# Patient Record
Sex: MALE | Race: WHITE | NOT HISPANIC OR LATINO | URBAN - METROPOLITAN AREA
[De-identification: names, ages, dates, MRNs, and addresses within clinical notes are randomized per-mention and may not be internally consistent; named-entity substitution may affect disease eponyms.]

---

## 2023-05-05 ENCOUNTER — ON CAMPUS - OUTPATIENT (OUTPATIENT)
Dept: URBAN - METROPOLITAN AREA HOSPITAL 2 | Facility: HOSPITAL | Age: 43
End: 2023-05-05

## 2023-05-05 VITALS
DIASTOLIC BLOOD PRESSURE: 61 MMHG | SYSTOLIC BLOOD PRESSURE: 109 MMHG | HEART RATE: 78 BPM | OXYGEN SATURATION: 98 % | HEART RATE: 80 BPM | RESPIRATION RATE: 17 BRPM | SYSTOLIC BLOOD PRESSURE: 91 MMHG | DIASTOLIC BLOOD PRESSURE: 68 MMHG | SYSTOLIC BLOOD PRESSURE: 112 MMHG | RESPIRATION RATE: 14 BRPM | RESPIRATION RATE: 16 BRPM | SYSTOLIC BLOOD PRESSURE: 127 MMHG | HEIGHT: 71 IN | HEART RATE: 74 BPM | DIASTOLIC BLOOD PRESSURE: 82 MMHG | HEART RATE: 75 BPM | DIASTOLIC BLOOD PRESSURE: 71 MMHG | DIASTOLIC BLOOD PRESSURE: 66 MMHG | HEART RATE: 69 BPM | TEMPERATURE: 98.6 F | WEIGHT: 197 LBS | HEART RATE: 73 BPM | SYSTOLIC BLOOD PRESSURE: 98 MMHG | DIASTOLIC BLOOD PRESSURE: 79 MMHG | SYSTOLIC BLOOD PRESSURE: 122 MMHG | DIASTOLIC BLOOD PRESSURE: 78 MMHG | RESPIRATION RATE: 15 BRPM | SYSTOLIC BLOOD PRESSURE: 101 MMHG | HEART RATE: 91 BPM | HEART RATE: 66 BPM | DIASTOLIC BLOOD PRESSURE: 62 MMHG | OXYGEN SATURATION: 97 % | OXYGEN SATURATION: 95 % | OXYGEN SATURATION: 96 % | SYSTOLIC BLOOD PRESSURE: 100 MMHG | DIASTOLIC BLOOD PRESSURE: 56 MMHG | OXYGEN SATURATION: 99 %

## 2023-05-05 DIAGNOSIS — K64.8 OTHER HEMORRHOIDS: ICD-10-CM

## 2023-05-05 DIAGNOSIS — R19.5 OTHER FECAL ABNORMALITIES: ICD-10-CM

## 2023-05-05 PROCEDURE — 45378 DIAGNOSTIC COLONOSCOPY: CPT | Performed by: INTERNAL MEDICINE

## 2023-05-05 RX ORDER — HYDROCORTISONE CREAM 1% 10 MG/G
2 CREAM TOPICAL
Qty: 100 | Refills: 3 | Status: ACTIVE
Start: 2023-05-05

## 2024-04-25 ENCOUNTER — TRANSCRIBE ORDERS (OUTPATIENT)
Dept: ADMINISTRATIVE | Facility: HOSPITAL | Age: 44
End: 2024-04-25
Payer: COMMERCIAL

## 2024-04-25 DIAGNOSIS — E06.9 THYROIDITIS: Primary | ICD-10-CM

## 2024-09-16 ENCOUNTER — LAB (OUTPATIENT)
Dept: FAMILY MEDICINE CLINIC | Facility: CLINIC | Age: 44
End: 2024-09-16
Payer: COMMERCIAL

## 2024-09-16 ENCOUNTER — OFFICE VISIT (OUTPATIENT)
Dept: FAMILY MEDICINE CLINIC | Facility: CLINIC | Age: 44
End: 2024-09-16
Payer: COMMERCIAL

## 2024-09-16 VITALS
OXYGEN SATURATION: 98 % | SYSTOLIC BLOOD PRESSURE: 90 MMHG | DIASTOLIC BLOOD PRESSURE: 54 MMHG | HEIGHT: 70 IN | TEMPERATURE: 98.3 F | BODY MASS INDEX: 29.68 KG/M2 | HEART RATE: 65 BPM | RESPIRATION RATE: 16 BRPM | WEIGHT: 207.3 LBS

## 2024-09-16 DIAGNOSIS — Z76.89 ENCOUNTER TO ESTABLISH CARE: ICD-10-CM

## 2024-09-16 DIAGNOSIS — E04.9 ENLARGED THYROID: ICD-10-CM

## 2024-09-16 DIAGNOSIS — E03.9 ACQUIRED HYPOTHYROIDISM: Primary | ICD-10-CM

## 2024-09-16 DIAGNOSIS — R51.9 SCALP PAIN: ICD-10-CM

## 2024-09-16 DIAGNOSIS — S16.1XXA NECK STRAIN, INITIAL ENCOUNTER: ICD-10-CM

## 2024-09-16 DIAGNOSIS — E03.9 ACQUIRED HYPOTHYROIDISM: ICD-10-CM

## 2024-09-16 PROBLEM — M54.2 NECK PAIN: Status: ACTIVE | Noted: 2024-09-16

## 2024-09-16 LAB
ALBUMIN SERPL-MCNC: 4.3 G/DL (ref 3.5–5.2)
ALBUMIN/GLOB SERPL: 1.5 G/DL
ALP SERPL-CCNC: 75 U/L (ref 39–117)
ALT SERPL W P-5'-P-CCNC: 33 U/L (ref 1–41)
ANION GAP SERPL CALCULATED.3IONS-SCNC: 8.7 MMOL/L (ref 5–15)
AST SERPL-CCNC: 18 U/L (ref 1–40)
BASOPHILS # BLD AUTO: 0.05 10*3/MM3 (ref 0–0.2)
BASOPHILS NFR BLD AUTO: 0.9 % (ref 0–1.5)
BILIRUB SERPL-MCNC: 0.6 MG/DL (ref 0–1.2)
BUN SERPL-MCNC: 14 MG/DL (ref 6–20)
BUN/CREAT SERPL: 12.6 (ref 7–25)
CALCIUM SPEC-SCNC: 10 MG/DL (ref 8.6–10.5)
CHLORIDE SERPL-SCNC: 104 MMOL/L (ref 98–107)
CHOLEST SERPL-MCNC: 213 MG/DL (ref 0–200)
CO2 SERPL-SCNC: 27.3 MMOL/L (ref 22–29)
CREAT SERPL-MCNC: 1.11 MG/DL (ref 0.76–1.27)
DEPRECATED RDW RBC AUTO: 40.3 FL (ref 37–54)
EGFRCR SERPLBLD CKD-EPI 2021: 84 ML/MIN/1.73
EOSINOPHIL # BLD AUTO: 0.19 10*3/MM3 (ref 0–0.4)
EOSINOPHIL NFR BLD AUTO: 3.4 % (ref 0.3–6.2)
ERYTHROCYTE [DISTWIDTH] IN BLOOD BY AUTOMATED COUNT: 12.5 % (ref 12.3–15.4)
GLOBULIN UR ELPH-MCNC: 2.8 GM/DL
GLUCOSE SERPL-MCNC: 86 MG/DL (ref 65–99)
HBA1C MFR BLD: 5 % (ref 4.8–5.6)
HCT VFR BLD AUTO: 44.8 % (ref 37.5–51)
HDLC SERPL-MCNC: 37 MG/DL (ref 40–60)
HGB BLD-MCNC: 14.7 G/DL (ref 13–17.7)
IMM GRANULOCYTES # BLD AUTO: 0.02 10*3/MM3 (ref 0–0.05)
IMM GRANULOCYTES NFR BLD AUTO: 0.4 % (ref 0–0.5)
LDLC SERPL CALC-MCNC: 156 MG/DL (ref 0–100)
LDLC/HDLC SERPL: 4.16 {RATIO}
LYMPHOCYTES # BLD AUTO: 1.59 10*3/MM3 (ref 0.7–3.1)
LYMPHOCYTES NFR BLD AUTO: 28.1 % (ref 19.6–45.3)
MCH RBC QN AUTO: 29.1 PG (ref 26.6–33)
MCHC RBC AUTO-ENTMCNC: 32.8 G/DL (ref 31.5–35.7)
MCV RBC AUTO: 88.7 FL (ref 79–97)
MONOCYTES # BLD AUTO: 0.57 10*3/MM3 (ref 0.1–0.9)
MONOCYTES NFR BLD AUTO: 10.1 % (ref 5–12)
NEUTROPHILS NFR BLD AUTO: 3.24 10*3/MM3 (ref 1.7–7)
NEUTROPHILS NFR BLD AUTO: 57.1 % (ref 42.7–76)
NRBC BLD AUTO-RTO: 0 /100 WBC (ref 0–0.2)
PLATELET # BLD AUTO: 259 10*3/MM3 (ref 140–450)
PMV BLD AUTO: 12 FL (ref 6–12)
POTASSIUM SERPL-SCNC: 4.5 MMOL/L (ref 3.5–5.2)
PROT SERPL-MCNC: 7.1 G/DL (ref 6–8.5)
RBC # BLD AUTO: 5.05 10*6/MM3 (ref 4.14–5.8)
SODIUM SERPL-SCNC: 140 MMOL/L (ref 136–145)
T4 FREE SERPL-MCNC: 1.41 NG/DL (ref 0.93–1.7)
TRIGL SERPL-MCNC: 111 MG/DL (ref 0–150)
TSH SERPL DL<=0.05 MIU/L-ACNC: 5.47 UIU/ML (ref 0.27–4.2)
VLDLC SERPL-MCNC: 20 MG/DL (ref 5–40)
WBC NRBC COR # BLD AUTO: 5.66 10*3/MM3 (ref 3.4–10.8)

## 2024-09-16 PROCEDURE — 36415 COLL VENOUS BLD VENIPUNCTURE: CPT

## 2024-09-16 PROCEDURE — 99204 OFFICE O/P NEW MOD 45 MIN: CPT | Performed by: NURSE PRACTITIONER

## 2024-09-16 PROCEDURE — 84443 ASSAY THYROID STIM HORMONE: CPT | Performed by: NURSE PRACTITIONER

## 2024-09-16 PROCEDURE — 83036 HEMOGLOBIN GLYCOSYLATED A1C: CPT | Performed by: NURSE PRACTITIONER

## 2024-09-16 PROCEDURE — 80053 COMPREHEN METABOLIC PANEL: CPT | Performed by: NURSE PRACTITIONER

## 2024-09-16 PROCEDURE — 80061 LIPID PANEL: CPT | Performed by: NURSE PRACTITIONER

## 2024-09-16 PROCEDURE — 84439 ASSAY OF FREE THYROXINE: CPT | Performed by: NURSE PRACTITIONER

## 2024-09-16 PROCEDURE — 85025 COMPLETE CBC W/AUTO DIFF WBC: CPT | Performed by: NURSE PRACTITIONER

## 2024-09-16 RX ORDER — LEVOTHYROXINE SODIUM 112 UG/1
1 TABLET ORAL DAILY
COMMUNITY
Start: 2024-08-16

## 2024-09-16 RX ORDER — METHOCARBAMOL 750 MG/1
750 TABLET, FILM COATED ORAL 3 TIMES DAILY
Qty: 12 TABLET | Refills: 0 | Status: SHIPPED | OUTPATIENT
Start: 2024-09-16

## 2024-11-11 ENCOUNTER — OFFICE VISIT (OUTPATIENT)
Dept: FAMILY MEDICINE CLINIC | Facility: CLINIC | Age: 44
End: 2024-11-11
Payer: COMMERCIAL

## 2024-11-11 VITALS
DIASTOLIC BLOOD PRESSURE: 68 MMHG | HEIGHT: 70 IN | SYSTOLIC BLOOD PRESSURE: 116 MMHG | BODY MASS INDEX: 29.71 KG/M2 | WEIGHT: 207.5 LBS | TEMPERATURE: 96.5 F | RESPIRATION RATE: 18 BRPM | OXYGEN SATURATION: 95 % | HEART RATE: 67 BPM

## 2024-11-11 DIAGNOSIS — E03.9 HYPOTHYROIDISM, UNSPECIFIED TYPE: Primary | ICD-10-CM

## 2024-11-11 DIAGNOSIS — S16.1XXA NECK STRAIN, INITIAL ENCOUNTER: ICD-10-CM

## 2024-11-11 PROCEDURE — 99213 OFFICE O/P EST LOW 20 MIN: CPT | Performed by: NURSE PRACTITIONER

## 2024-11-11 RX ORDER — METHOCARBAMOL 750 MG/1
750 TABLET, FILM COATED ORAL 3 TIMES DAILY
Qty: 12 TABLET | Refills: 0 | Status: SHIPPED | OUTPATIENT
Start: 2024-11-11

## 2024-11-11 RX ORDER — METHOCARBAMOL 750 MG/1
750 TABLET, FILM COATED ORAL 3 TIMES DAILY
Qty: 12 TABLET | Refills: 0 | Status: SHIPPED | OUTPATIENT
Start: 2024-11-11 | End: 2024-11-11 | Stop reason: SDUPTHER

## 2024-11-11 RX ORDER — LEVOTHYROXINE SODIUM 137 UG/1
137 TABLET ORAL DAILY
Qty: 90 TABLET | Refills: 1 | Status: SHIPPED | OUTPATIENT
Start: 2024-11-11 | End: 2025-05-10

## 2024-11-11 NOTE — PATIENT INSTRUCTIONS
Call Dr Lee - Endocrinology office to schedule an appointment.    [facility]  [address] [place] [address] [phone]

## 2024-11-11 NOTE — PROGRESS NOTES
"Chief Complaint  Follow-up    Subjective        Dakotah Sarah presents to [facility]  History of Present Illness  45 y/o male presents to  office to follow up on [facility] care labs and visit.  He states that he has not established with a endocrinologist, yet, as instructed from last visit.  Follow-up  Conditions present:  Thyroid disorder  Medication compliance: fair. Side effects of treatment: muscle pain. Treatment compliance: all of the time. Treatment barriers: no compliance problems. Exercises: rarely.       Objective   Vital Signs:  /68 (BP Location: Left arm, Patient Position: Sitting, Cuff Size: Adult)   Pulse 67   Temp 96.5 °F (35.8 °C) (Temporal)   Resp 18   Ht 177.8 cm (70\")   Wt 94.1 kg (207 lb 8 oz)   SpO2 95%   BMI 29.77 kg/m²   Estimated body mass index is 29.77 kg/m² as calculated from the following:    Height as of this encounter: 177.8 cm (70\").    Weight as of this encounter: 94.1 kg (207 lb 8 oz).    Physical Exam  Vitals reviewed.   Constitutional:       General: He is not in acute distress.     Appearance: He is not ill-appearing, toxic-appearing or diaphoretic.   HENT:      Head: Normocephalic and atraumatic.      Mouth/Throat:      Mouth: Mucous membranes are moist.      Pharynx: Oropharynx is clear.   Cardiovascular:      Rate and Rhythm: Normal rate and regular rhythm.      Pulses: Normal pulses.      Heart sounds: Normal heart sounds.   Pulmonary:      Effort: Pulmonary effort is normal.      Breath sounds: Normal breath sounds.   Skin:     General: Skin is warm and dry.      Capillary Refill: Capillary refill takes less than 2 seconds.   Neurological:      General: No focal deficit present.      Mental Status: He is alert and oriented to person, place, and time.   Psychiatric:         Mood and Affect: Affect is blunt and flat.         Speech: Speech normal.         Behavior: Behavior is cooperative.         Judgment: Judgment normal.      "   Result Review :  The following data was reviewed by: JENNIFER Nielson on 11/11/2024:  CMP          9/16/2024    08:53   CMP   Glucose 86    BUN 14    Creatinine 1.11    EGFR 84.0    Sodium 140    Potassium 4.5    Chloride 104    Calcium 10.0    Total Protein 7.1    Albumin 4.3    Globulin 2.8    Total Bilirubin 0.6    Alkaline Phosphatase 75    AST (SGOT) 18    ALT (SGPT) 33    Albumin/Globulin Ratio 1.5    BUN/Creatinine Ratio 12.6    Anion Gap 8.7      CBC w/diff          9/16/2024    08:53   CBC w/Diff   WBC 5.66    RBC 5.05    Hemoglobin 14.7    Hematocrit 44.8    MCV 88.7    MCH 29.1    MCHC 32.8    RDW 12.5    Platelets 259    Neutrophil Rel % 57.1    Immature Granulocyte Rel % 0.4    Lymphocyte Rel % 28.1    Monocyte Rel % 10.1    Eosinophil Rel % 3.4    Basophil Rel % 0.9      Lipid Panel          9/16/2024    08:53   Lipid Panel   Total Cholesterol 213    Triglycerides 111    HDL Cholesterol 37    VLDL Cholesterol 20    LDL Cholesterol  156    LDL/HDL Ratio 4.16      TSH          9/16/2024    08:53   TSH   TSH 5.470      A1C Last 3 Results          9/16/2024    08:53   HGBA1C Last 3 Results   Hemoglobin A1C 5.00              Assessment and Plan   Diagnoses and all orders for this visit:    1. Hypothyroidism, unspecified type (Primary)  -     levothyroxine (SYNTHROID, LEVOTHROID) 137 MCG tablet; Take 1 tablet by mouth Daily for 180 days.  Dispense: 90 tablet; Refill: 1    2. Neck strain, initial encounter  -     Discontinue: methocarbamol (ROBAXIN) 750 MG tablet; Take 1 tablet by mouth 3 (Three) Times a Day.  Dispense: 12 tablet; Refill: 0  -     methocarbamol (ROBAXIN) 750 MG tablet; Take 1 tablet by mouth 3 (Three) Times a Day.  Dispense: 12 tablet; Refill: 0    Patient reports that he forgot to establish with Endocrinology.  Highly recommend - contact information for Dr Lee provided in AVS.       I spent 20 minutes caring for Dakotah on this date of service. This time includes time spent by me in the  following activities:preparing for the visit, reviewing tests, performing a medically appropriate examination and/or evaluation , counseling and educating the patient/family/caregiver, ordering medications, tests, or procedures, referring and communicating with other health care professionals , and documenting information in the medical record  Follow Up   Return in about 6 months (around 5/11/2025).  Patient was given instructions and counseling regarding his condition or for health maintenance advice. Please see specific information pulled into the AVS if appropriate.

## 2025-01-20 ENCOUNTER — OFFICE VISIT (OUTPATIENT)
Dept: ENDOCRINOLOGY | Facility: CLINIC | Age: 45
End: 2025-01-20
Payer: COMMERCIAL

## 2025-01-20 VITALS
HEART RATE: 73 BPM | SYSTOLIC BLOOD PRESSURE: 114 MMHG | OXYGEN SATURATION: 99 % | BODY MASS INDEX: 30.21 KG/M2 | DIASTOLIC BLOOD PRESSURE: 62 MMHG | WEIGHT: 211 LBS | HEIGHT: 70 IN

## 2025-01-20 DIAGNOSIS — E66.3 OVERWEIGHT: ICD-10-CM

## 2025-01-20 DIAGNOSIS — E06.3 HYPOTHYROIDISM DUE TO HASHIMOTO THYROIDITIS: Primary | ICD-10-CM

## 2025-01-20 DIAGNOSIS — E03.9 HYPOTHYROIDISM, UNSPECIFIED TYPE: ICD-10-CM

## 2025-01-20 PROCEDURE — 99204 OFFICE O/P NEW MOD 45 MIN: CPT | Performed by: INTERNAL MEDICINE

## 2025-01-20 NOTE — PROGRESS NOTES
-----------------------------------------------------------------  ENDOCRINE CLINIC NOTE  -----------------------------------------------------------------        PATIENT NAME: Dakotah Sarah  PATIENT : 1980 AGE: 44 y.o.  MRN NUMBER: [id number]  PRIMARY CARE: Mechelle Tobin APRN    ==========================================================================    CHIEF COMPLAINT: Hypothyroidism  DATE OF SERVICE: 25    ==========================================================================    HPI / SUBJECTIVE    44 y.o. male is seen in the clinic today for hypothyroidism.  Known to have hypothyroidism for last 7 yrs.  Started with low dose and uptitrated upto 137 mcg PO daily.  On this dose since last 6 months.  Previously have seen Dr. Johnson in the [facility] as well.  Had US Thyroid done in May 2024 and showed diffuse thyromegaly.  Also patient had TPO done in  which was high.    ==========================================================================                                                PAST MEDICAL HISTORY    Past Medical History:   Diagnosis Date    Hypothyroidism     Blood work showed abnormal T3 and T4.       ==========================================================================    PAST SURGICAL HISTORY    History reviewed. No pertinent surgical history.    ==========================================================================    FAMILY HISTORY    Family History   Problem Relation Age of Onset    Thyroid disease Mother     Thyroid disease Sister        ==========================================================================    SOCIAL HISTORY    Social History     Socioeconomic History    Marital status:    Tobacco Use    Smoking status: Former     Current packs/day: 0.00     Average packs/day: 1 pack/day for 10.3 years (10.3 ttl pk-yrs)     Types: Cigarettes     Start date: 1999     Quit date: 4/3/2009     Years since quitting: 15.8     Passive exposure:  Never    Smokeless tobacco: Never    Tobacco comments:     I do not smoke at all anymore.   Vaping Use    Vaping status: Never Used   Substance and Sexual Activity    Alcohol use: Yes     Alcohol/week: 3.0 standard drinks of alcohol     Types: 2 Glasses of wine, 1 Cans of beer per week     Comment: 2-4 glasses of wine per month    Drug use: Never    Sexual activity: Yes     Partners: Female     Birth control/protection: Condom       ==========================================================================    MEDICATIONS      Current Outpatient Medications:     levothyroxine (SYNTHROID, LEVOTHROID) 137 MCG tablet, Take 1 tablet by mouth Daily for 180 days., Disp: 90 tablet, Rfl: 1    methocarbamol (ROBAXIN) 750 MG tablet, Take 1 tablet by mouth 3 (Three) Times a Day. (Patient not taking: Reported on 1/20/2025), Disp: 12 tablet, Rfl: 0    ==========================================================================    ALLERGIES    No Known Allergies    ==========================================================================    OBJECTIVE    Vitals:    01/20/25 1005   BP: 114/62   Pulse: 73   SpO2: 99%     Body mass index is 30.28 kg/m².     General: Alert, cooperative, no acute distress  Thyroid:  no enlargement/tenderness/palpable nodules  Lungs: Clear to auscultation bilaterally, respirations unlabored  Heart: Regular rate and rhythm, S1 and S2 normal, no murmur, rub or gallop  Abdomen: Soft, NT, ND and Bowel sounds Positive  Extremities:  Extremities normal, atraumatic, no cyanosis or edema    ==========================================================================    LAB EVALUATION    Lab Results   Component Value Date    GLUCOSE 86 09/16/2024    BUN 14 09/16/2024    CREATININE 1.11 09/16/2024    BCR 12.6 09/16/2024    K 4.5 09/16/2024    CO2 27.3 09/16/2024    CALCIUM 10.0 09/16/2024    ALBUMIN 4.3 09/16/2024    AST 18 09/16/2024    ALT 33 09/16/2024    CHOL 213 (H) 09/16/2024    TRIG 111 09/16/2024    HDL 37  (L) 09/16/2024     (H) 09/16/2024     Lab Results   Component Value Date    HGBA1C 5.00 09/16/2024     Lab Results   Component Value Date    CREATININE 1.11 09/16/2024     Lab Results   Component Value Date    TSH 5.470 (H) 09/16/2024    FREET4 1.41 09/16/2024       ==========================================================================    ASSESSMENT AND PLAN    # Hypothyroidism due to Hashimoto's thyroiditis  # Overweight     - Counseled patient in detail about pathophysiology of thyroid gland and thyroid hormone  - Currently on LT4 137 mcg PO Daily  - Continue current therapy and will repeat blood work now and adjust therapy if needed  - Repeat blood work in 6 months and clinical follow up    Thank you for courtesy of consultation.    Return to clinic: 6 months    Entire assessment and plan was discussed and counseled the patient in detail to which patient verbalized understanding and agreed with care.  Answered all queries and concerns.    Part of this note may be an electronic transcription/translation of spoken language to printed text using the Dragon Dictation System.     Note: Portions of this note may have been copied from previous notes but documentation have been reviewed and edited as necessary to support clinical decision making for today's visit.    ==========================================================================    INFORMATION PROVIDED TO PATIENT    Patient Instructions   - Continue levothyroxine tablets 137 mcg by mouth daily at 6:00 AM  - Take it every day, on an empty stomach, 30 minutes before eating  - Avoid taking it with iron, calcium, other medications (blocks absorption), wait at least 4 hrs after taking levothyroxine to take these medications  - If you miss a pill, you can take 2 the next day and return to schedule from next day     Blood work today and in 6 months time.    Follow up in 6 months time.    Thank you for your visit today.    If you have any questions or  concerns please feel free to reach out of the office.       ==========================================================================  Glenroy Lee MD  Department of Endocrine, Diabetes and Metabolism  [facility] [place]  ==========================================================================

## 2025-01-20 NOTE — PATIENT INSTRUCTIONS
- Continue levothyroxine tablets 137 mcg by mouth daily at 6:00 AM  - Take it every day, on an empty stomach, 30 minutes before eating  - Avoid taking it with iron, calcium, other medications (blocks absorption), wait at least 4 hrs after taking levothyroxine to take these medications  - If you miss a pill, you can take 2 the next day and return to schedule from next day     Blood work today and in 6 months time.    Follow up in 6 months time.    Thank you for your visit today.    If you have any questions or concerns please feel free to reach out of the office.

## 2025-01-21 LAB
T4 FREE SERPL-MCNC: 1.54 NG/DL (ref 0.82–1.77)
TSH SERPL DL<=0.005 MIU/L-ACNC: 2.47 UIU/ML (ref 0.45–4.5)

## 2025-01-23 ENCOUNTER — PATIENT ROUNDING (BHMG ONLY) (OUTPATIENT)
Dept: ENDOCRINOLOGY | Facility: CLINIC | Age: 45
End: 2025-01-23
Payer: COMMERCIAL

## 2025-01-23 NOTE — PROGRESS NOTES
January 23, 2025    Hello, may I speak with Dakotah Sarah?    My name is KAREN      I am  with [facility]  [address] [place] [address].    Before we get started may I verify your date of birth? 1980    I am calling to officially welcome you to our practice and ask about your recent visit. Is this a good time to talk? yes    Tell me about your visit with us. What things went well?  EVERYTHING       We're always looking for ways to make our patients' experiences even better. Do you have recommendations on ways we may improve?  no    Overall were you satisfied with your first visit to our practice? yes       I appreciate you taking the time to speak with me today. Is there anything else I can do for you? no      Thank you, and have a great day.

## 2025-05-12 ENCOUNTER — OFFICE VISIT (OUTPATIENT)
Dept: FAMILY MEDICINE CLINIC | Facility: CLINIC | Age: 45
End: 2025-05-12
Payer: COMMERCIAL

## 2025-05-12 VITALS
WEIGHT: 206.1 LBS | SYSTOLIC BLOOD PRESSURE: 115 MMHG | BODY MASS INDEX: 29.51 KG/M2 | OXYGEN SATURATION: 95 % | RESPIRATION RATE: 18 BRPM | DIASTOLIC BLOOD PRESSURE: 74 MMHG | TEMPERATURE: 98.1 F | HEART RATE: 67 BPM | HEIGHT: 70 IN

## 2025-05-12 DIAGNOSIS — Z12.5 SPECIAL SCREENING FOR MALIGNANT NEOPLASM OF PROSTATE: ICD-10-CM

## 2025-05-12 DIAGNOSIS — Z00.00 HEALTHCARE MAINTENANCE: Primary | ICD-10-CM

## 2025-05-12 DIAGNOSIS — S16.1XXA NECK STRAIN, INITIAL ENCOUNTER: ICD-10-CM

## 2025-05-12 PROCEDURE — 99214 OFFICE O/P EST MOD 30 MIN: CPT | Performed by: NURSE PRACTITIONER

## 2025-05-12 PROCEDURE — 99396 PREV VISIT EST AGE 40-64: CPT | Performed by: NURSE PRACTITIONER

## 2025-05-12 RX ORDER — LEVOTHYROXINE SODIUM 137 UG/1
137 TABLET ORAL DAILY
Qty: 90 TABLET | Refills: 2 | Status: SHIPPED | OUTPATIENT
Start: 2025-05-12

## 2025-05-12 RX ORDER — LEVOTHYROXINE SODIUM 137 UG/1
137 TABLET ORAL
COMMUNITY
End: 2025-05-12

## 2025-05-12 RX ORDER — LIDOCAINE 50 MG/G
1 PATCH TOPICAL EVERY 24 HOURS
Qty: 5 PATCH | Refills: 0 | Status: SHIPPED | OUTPATIENT
Start: 2025-05-12 | End: 2025-05-17

## 2025-05-12 NOTE — ASSESSMENT & PLAN NOTE
Labs ordered to be obtained about 1 week prior to annual physical exam to be scheduled for mid Sept. 2025.

## 2025-05-12 NOTE — PROGRESS NOTES
"Chief Complaint  Annual Exam (For thyroid)    Subjective        Dakotah Sarah presents to [facility]  History of Present Illness  43 y/o Dakotah presents to [facility] office for 6 month f/u.  Neck Pain   This is a chronic problem. The current episode started more than 1 month ago. The problem occurs intermittently. The problem has been gradually improving. The pain is associated with a sleep position. The pain is present in the left side and occipital region. The quality of the pain is described as aching and cramping. The pain is mild. The symptoms are aggravated by stress and position. Pertinent negatives include no fever, numbness, pain with swallowing, paresis, tingling, trouble swallowing, visual change or weakness. He has tried neck support and muscle relaxants for the symptoms. The treatment provided moderate relief.       Objective   Vital Signs:  /74 (BP Location: Left arm, Patient Position: Sitting, Cuff Size: Adult)   Pulse 67   Temp 98.1 °F (36.7 °C) (Temporal)   Resp 18   Ht 177.8 cm (70\")   Wt 93.5 kg (206 lb 1.6 oz)   SpO2 95%   BMI 29.57 kg/m²   Estimated body mass index is 29.57 kg/m² as calculated from the following:    Height as of this encounter: 177.8 cm (70\").    Weight as of this encounter: 93.5 kg (206 lb 1.6 oz).      Physical Exam  Constitutional:       General: He is not in acute distress.     Appearance: Normal appearance. He is not ill-appearing, toxic-appearing or diaphoretic.   HENT:      Head: Normocephalic and atraumatic.   Neck:     Musculoskeletal:      Cervical back: Full passive range of motion without pain, normal range of motion and neck supple. Tenderness present. No edema, erythema, signs of trauma, rigidity, torticollis or crepitus. Muscular tenderness present. No pain with movement or spinous process tenderness. Normal range of motion.   Neurological:      Mental Status: He is alert.        Result Review :  The following data was reviewed " by: JENNIFER Nielson on 05/12/2025:  Common labs          9/16/2024    08:53   Common Labs   Glucose 86    BUN 14    Creatinine 1.11    Sodium 140    Potassium 4.5    Chloride 104    Calcium 10.0    Albumin 4.3    Total Bilirubin 0.6    Alkaline Phosphatase 75    AST (SGOT) 18    ALT (SGPT) 33    WBC 5.66    Hemoglobin 14.7    Hematocrit 44.8    Platelets 259    Total Cholesterol 213    Triglycerides 111    HDL Cholesterol 37    LDL Cholesterol  156    Hemoglobin A1C 5.00      TSH          9/16/2024    08:53 1/20/2025    11:03   TSH   TSH 5.470  2.470             Assessment and Plan   Diagnoses and all orders for this visit:    1. Healthcare maintenance (Primary)  Assessment & Plan:  Labs ordered to be obtained about 1 week prior to annual physical exam to be scheduled for mid Sept. 2025.    Orders:  -     Hepatitis C antibody; Future  -     PSA SCREENING; Future  -     CBC w MANUAL Differential; Future  -     Comprehensive metabolic panel; Future  -     Lipid panel; Future  -     Urinalysis With Culture If Indicated - Urine, Clean Catch; Future  -     Hemoglobin A1c; Future    2. Special screening for malignant neoplasm of prostate  Assessment & Plan:  Labs ordered to be obtained about 1 week prior to annual physical exam to be scheduled for mid Sept. 2025.    Orders:  -     PSA SCREENING; Future    3. Neck strain, initial encounter  Assessment & Plan:  Persistent neck pain.  Advised neck static stretches and improve posture.  Written education provided in AVS.  Recommend OTC Solanpas and/or Biofreeze use, as directed.  Lidocaine patches prescribed, if insurance allows.    Orders:  -     lidocaine (LIDODERM) 5 %; Place 1 patch on the skin as directed by provider Daily for 5 days. Remove & Discard patch within 12 hours or as directed by MD  Dispense: 5 patch; Refill: 0           I spent 30 minutes caring for Dakotah on this date of service. This time includes time spent by me in the following activities:preparing  for the visit, reviewing tests, obtaining and/or reviewing a separately obtained history, performing a medically appropriate examination and/or evaluation , counseling and educating the patient/family/caregiver, ordering medications, tests, or procedures, documenting information in the medical record, and independently interpreting results and communicating that information with the patient/family/caregiver  Follow Up   Return in about 18 weeks (around 9/15/2025) for Annual physical, Next scheduled follow up.  Patient was given instructions and counseling regarding his condition or for health maintenance advice. Please see specific information pulled into the AVS if appropriate.

## 2025-05-12 NOTE — ASSESSMENT & PLAN NOTE
Persistent neck pain.  Advised neck static stretches and improve posture.  Written education provided in AVS.  Recommend OTC Solanpas and/or Biofreeze use, as directed.  Lidocaine patches prescribed, if insurance allows.

## 2025-07-15 LAB
T4 FREE SERPL-MCNC: 1.27 NG/DL (ref 0.82–1.77)
TSH SERPL DL<=0.005 MIU/L-ACNC: 0.42 UIU/ML (ref 0.45–4.5)

## 2025-07-21 ENCOUNTER — OFFICE VISIT (OUTPATIENT)
Dept: ENDOCRINOLOGY | Facility: CLINIC | Age: 45
End: 2025-07-21
Payer: COMMERCIAL

## 2025-07-21 VITALS
SYSTOLIC BLOOD PRESSURE: 124 MMHG | DIASTOLIC BLOOD PRESSURE: 78 MMHG | BODY MASS INDEX: 28.75 KG/M2 | HEART RATE: 71 BPM | WEIGHT: 200.8 LBS | OXYGEN SATURATION: 96 % | HEIGHT: 70 IN

## 2025-07-21 DIAGNOSIS — E66.3 OVERWEIGHT: ICD-10-CM

## 2025-07-21 DIAGNOSIS — E06.3 HYPOTHYROIDISM DUE TO HASHIMOTO THYROIDITIS: Primary | ICD-10-CM

## 2025-07-21 PROCEDURE — 99214 OFFICE O/P EST MOD 30 MIN: CPT | Performed by: INTERNAL MEDICINE

## 2025-07-21 RX ORDER — LEVOTHYROXINE SODIUM 137 UG/1
137 TABLET ORAL DAILY
Qty: 90 TABLET | Refills: 3 | Status: SHIPPED | OUTPATIENT
Start: 2025-07-21

## 2025-07-21 NOTE — PROGRESS NOTES
-----------------------------------------------------------------  ENDOCRINE CLINIC NOTE  -----------------------------------------------------------------        PATIENT NAME: Dakotah Sarah  PATIENT : 1980 AGE: 45 y.o.  MRN NUMBER: [id number]  PRIMARY CARE: Mechelle Tobin APRN    ==========================================================================    CHIEF COMPLAINT: Hypothyroidism  DATE OF SERVICE: 25    ==========================================================================    HPI / SUBJECTIVE    45 y.o. male is seen in the clinic today for hypothyroidism.  Known to have hypothyroidism for last 8 yrs.  Current dosing levothyroxine 137 mcg PO daily.  Previously have seen Dr. Johnson in the [facility] as well.  Had US Thyroid done in May 2024 and showed diffuse thyromegaly.  Also patient had TPO done in  which was high.    ==========================================================================                                                PAST MEDICAL HISTORY    Past Medical History:   Diagnosis Date    Hypothyroidism     Blood work showed abnormal T3 and T4.       ==========================================================================    PAST SURGICAL HISTORY    History reviewed. No pertinent surgical history.    ==========================================================================    FAMILY HISTORY    Family History   Problem Relation Age of Onset    Thyroid disease Mother     Heart disease Mother     Hyperlipidemia Mother     Thyroid disease Sister        ==========================================================================    SOCIAL HISTORY    Social History     Socioeconomic History    Marital status:    Tobacco Use    Smoking status: Former     Current packs/day: 0.00     Average packs/day: 1 pack/day for 10.3 years (10.3 ttl pk-yrs)     Types: Cigarettes     Start date: 1999     Quit date: 4/3/2009     Years since quittin.3     Passive exposure:  Past    Smokeless tobacco: Never    Tobacco comments:     I do not smoke at all anymore.   Vaping Use    Vaping status: Never Used   Substance and Sexual Activity    Alcohol use: Yes     Alcohol/week: 3.0 standard drinks of alcohol     Types: 2 Glasses of wine, 1 Cans of beer per week     Comment: 2-4 glasses of wine per month    Drug use: Never    Sexual activity: Yes     Partners: Female     Birth control/protection: Condom       ==========================================================================    MEDICATIONS      Current Outpatient Medications:     levothyroxine (SYNTHROID, LEVOTHROID) 137 MCG tablet, TAKE 1 TABLET BY MOUTH DAILY, Disp: 90 tablet, Rfl: 2    ==========================================================================    ALLERGIES    No Known Allergies    ==========================================================================    OBJECTIVE    Vitals:    07/21/25 0800   BP: 124/78   Pulse: 71   SpO2: 96%     Body mass index is 28.81 kg/m².     General: Alert, cooperative, no acute distress  Thyroid:  no enlargement/tenderness/palpable nodules  Lungs: Clear to auscultation bilaterally, respirations unlabored  Heart: Regular rate and rhythm, S1 and S2 normal, no murmur, rub or gallop  Abdomen: Soft, NT, ND and Bowel sounds Positive  Extremities:  Extremities normal, atraumatic, no cyanosis or edema    ==========================================================================    LAB EVALUATION    Lab Results   Component Value Date    GLUCOSE 86 09/16/2024    BUN 14 09/16/2024    CREATININE 1.11 09/16/2024    BCR 12.6 09/16/2024    K 4.5 09/16/2024    CO2 27.3 09/16/2024    CALCIUM 10.0 09/16/2024    ALBUMIN 4.3 09/16/2024    AST 18 09/16/2024    ALT 33 09/16/2024    CHOL 213 (H) 09/16/2024    TRIG 111 09/16/2024    HDL 37 (L) 09/16/2024     (H) 09/16/2024     Lab Results   Component Value Date    HGBA1C 5.00 09/16/2024     Lab Results   Component Value Date    CREATININE 1.11 09/16/2024      Lab Results   Component Value Date    TSH 0.422 (L) 07/14/2025    FREET4 1.27 07/14/2025       ==========================================================================    ASSESSMENT AND PLAN    # Hypothyroidism due to Hashimoto's thyroiditis  # Overweight with BMI 28.81    - Reviewed blood work from 7/14/2025  - Patient TSH is 0.422 which is on the lower end but patient free T4 have been persistently staying stable on this dose of levothyroxine 137 mcg p.o. daily  - Given stable free T4 levels we will plan to continue current therapy without any changes and repeat blood work in 6 months    Return to clinic: 6 months    Entire assessment and plan was discussed and counseled the patient in detail to which patient verbalized understanding and agreed with care.  Answered all queries and concerns.    Part of this note may be an electronic transcription/translation of spoken language to printed text using the Dragon Dictation System.     Note: Portions of this note may have been copied from previous notes but documentation have been reviewed and edited as necessary to support clinical decision making for today's visit.    ==========================================================================    INFORMATION PROVIDED TO PATIENT    Patient Instructions   - Continue levothyroxine tablets 137 mcg by mouth daily at 6:00 AM  - Take it every day, on an empty stomach, 30 minutes before eating  - Avoid taking it with iron, calcium, other medications (blocks absorption), wait at least 4 hrs after taking levothyroxine to take these medications  - If you miss a pill, you can take 2 the next day and return to schedule from next day     Blood work and follow up in 6 months time.    Thank you for your visit today.    If you have any questions or concerns please feel free to reach out of the office.       ==========================================================================  Glenroy Lee MD  Department of Endocrine, Diabetes  and Metabolism  [facility]  [place], [place]  ==========================================================================

## 2025-07-21 NOTE — PATIENT INSTRUCTIONS
- Continue levothyroxine tablets 137 mcg by mouth daily at 6:00 AM  - Take it every day, on an empty stomach, 30 minutes before eating  - Avoid taking it with iron, calcium, other medications (blocks absorption), wait at least 4 hrs after taking levothyroxine to take these medications  - If you miss a pill, you can take 2 the next day and return to schedule from next day     Blood work and follow up in 6 months time.    Thank you for your visit today.    If you have any questions or concerns please feel free to reach out of the office.